# Patient Record
Sex: FEMALE | Race: BLACK OR AFRICAN AMERICAN | URBAN - METROPOLITAN AREA
[De-identification: names, ages, dates, MRNs, and addresses within clinical notes are randomized per-mention and may not be internally consistent; named-entity substitution may affect disease eponyms.]

---

## 2021-09-21 ENCOUNTER — OFFICE VISIT (OUTPATIENT)
Dept: URBAN - METROPOLITAN AREA CLINIC 105 | Facility: CLINIC | Age: 41
End: 2021-09-21
Payer: COMMERCIAL

## 2021-09-21 ENCOUNTER — WEB ENCOUNTER (OUTPATIENT)
Dept: URBAN - METROPOLITAN AREA CLINIC 105 | Facility: CLINIC | Age: 41
End: 2021-09-21

## 2021-09-21 VITALS
TEMPERATURE: 97.5 F | HEIGHT: 65 IN | SYSTOLIC BLOOD PRESSURE: 134 MMHG | WEIGHT: 165.8 LBS | HEART RATE: 66 BPM | BODY MASS INDEX: 27.63 KG/M2 | DIASTOLIC BLOOD PRESSURE: 85 MMHG

## 2021-09-21 DIAGNOSIS — K62.89 RECTAL PAIN: ICD-10-CM

## 2021-09-21 DIAGNOSIS — K59.09 CHRONIC CONSTIPATION: ICD-10-CM

## 2021-09-21 DIAGNOSIS — R10.2 PELVIC PAIN: ICD-10-CM

## 2021-09-21 PROCEDURE — 99204 OFFICE O/P NEW MOD 45 MIN: CPT | Performed by: INTERNAL MEDICINE

## 2021-09-21 RX ORDER — GABAPENTIN 300 MG/1
CAPSULE ORAL
Qty: 30 | Status: ACTIVE | COMMUNITY

## 2021-09-21 RX ORDER — LINACLOTIDE 145 UG/1
1 CAPSULE AT LEAST 30 MINUTES BEFORE THE FIRST MEAL OF THE DAY ON AN EMPTY STOMACH CAPSULE, GELATIN COATED ORAL ONCE A DAY
Qty: 90 | Refills: 1

## 2021-09-21 RX ORDER — LINACLOTIDE 145 UG/1
1 CAPSULE AT LEAST 30 MINUTES BEFORE THE FIRST MEAL OF THE DAY ON AN EMPTY STOMACH CAPSULE, GELATIN COATED ORAL ONCE A DAY
Status: ACTIVE | COMMUNITY

## 2021-09-21 RX ORDER — IBUPROFEN 800 MG/1
1 TABLET WITH FOOD OR MILK AS NEEDED TABLET ORAL THREE TIMES A DAY
Status: ACTIVE | COMMUNITY

## 2021-09-21 NOTE — HPI-TODAY'S VISIT:
40 yo female who presents with rectal pain since Jan. 2021.  Patient lives in [place], [place].  She states she has the pain constantly for a week every month.  It can wax and wane during that week.  There is no pattern to the pain.  She has used Proctofoam for 2 months which helped in the beginning.  She tried a warm water bath which helped in the beginning.  However, both of these soon did not provide relief.  She tried ibuprofen 800 mg 2-3x/day with no relief.  She tried hydrocodone 5 mg which made her sleepy, but did not help.  She saw her PCP and then her OB-GYN.  She had a hysterectomy in July 2021 (had a previous ablation for uterine bleeding in 2018) thinking this would resolve her pain, but it did not.  She was referred to a GI in [place] who did a colonoscopy 8 days ago.  She was noted to have an area of abnormal mucosa/raised area in the rectum which on biopsies was hyperplastic mucosa.  She states her OB-GYN referred her to a GI in [place], but she presented to me today instead.    She has a history of chronic constipation and takes Linzess 72 mcg QD on an empty stomach.  She has taken it for a month.  She has a BM every 4-5 days on it.

## 2021-11-03 ENCOUNTER — OFFICE VISIT (OUTPATIENT)
Dept: URBAN - METROPOLITAN AREA CLINIC 105 | Facility: CLINIC | Age: 41
End: 2021-11-03
Payer: COMMERCIAL

## 2021-11-03 ENCOUNTER — LAB OUTSIDE AN ENCOUNTER (OUTPATIENT)
Dept: URBAN - METROPOLITAN AREA CLINIC 105 | Facility: CLINIC | Age: 41
End: 2021-11-03

## 2021-11-03 VITALS
WEIGHT: 161.8 LBS | SYSTOLIC BLOOD PRESSURE: 140 MMHG | BODY MASS INDEX: 26.96 KG/M2 | HEART RATE: 73 BPM | HEIGHT: 65 IN | DIASTOLIC BLOOD PRESSURE: 83 MMHG | TEMPERATURE: 97.2 F

## 2021-11-03 DIAGNOSIS — R10.2 PELVIC PAIN: ICD-10-CM

## 2021-11-03 DIAGNOSIS — K59.09 CHRONIC CONSTIPATION: ICD-10-CM

## 2021-11-03 DIAGNOSIS — K62.89 RECTAL PAIN: ICD-10-CM

## 2021-11-03 PROCEDURE — 99214 OFFICE O/P EST MOD 30 MIN: CPT | Performed by: INTERNAL MEDICINE

## 2021-11-03 RX ORDER — LINACLOTIDE 145 UG/1
1 CAPSULE AT LEAST 30 MINUTES BEFORE THE FIRST MEAL OF THE DAY ON AN EMPTY STOMACH CAPSULE, GELATIN COATED ORAL ONCE A DAY
Qty: 90 | Refills: 1 | Status: ACTIVE | COMMUNITY

## 2021-11-03 RX ORDER — GABAPENTIN 300 MG/1
CAPSULE ORAL
Qty: 30 | Status: ON HOLD | COMMUNITY

## 2021-11-03 RX ORDER — IBUPROFEN 800 MG/1
1 TABLET WITH FOOD OR MILK AS NEEDED TABLET ORAL THREE TIMES A DAY
Status: ACTIVE | COMMUNITY

## 2021-11-03 NOTE — HPI-TODAY'S VISIT:
42 yo female who presents with rectal pain since Jan. 2021.  Patient lives in [place], [place].  On 9/21/21, she stated she had the pain constantly for a week every month.  It could wax and wane during that week. There was no pattern to the pain. She had used Proctofoam for 2 months which helped in the beginning. She tried a warm water bath which helped in the beginning.  However, both of these soon did not provide relief.  She tried ibuprofen 800 mg 2-3x/day with no relief. She tried hydrocodone 5 mg which made her sleepy, but did not help. She saw her PCP and then her OB-GYN.  She had a hysterectomy in July 2021 (had a previous ablation for uterine bleeding in 2018) thinking this would resolve her pain, but it did not. She was referred to a GI in [place] who did a colonoscopy 8 days ago. She was noted to have an area of abnormal mucosa/raised area in the rectum which on biopsies was hyperplastic mucosa. She stated her OB-GYN referred her to a GI in [place], but she presented to me today instead.    She had a history of chronic constipation and took Linzess 72 mcg QD on an empty stomach. She had taken it for a month.  She had a BM every 4-5 days on it.  Today, she says she hasn't needed to use nitroglycerin. She says she was doing well for the first 2 weeks on Linzess 145 mcg QD - had 1 soft BM QD - but has increased to 2 pills QAM (empty stomach) for the last few days. She is not using Miralax. Constipation has been an issue all her life.  She got COVID Oct 1st (headache, fever, fatigue, SOB). She is not COVID or flu vaccinated. Pelvic pain has resolved.

## 2021-11-05 ENCOUNTER — TELEPHONE ENCOUNTER (OUTPATIENT)
Dept: URBAN - METROPOLITAN AREA CLINIC 105 | Facility: CLINIC | Age: 41
End: 2021-11-05

## 2021-12-15 ENCOUNTER — OFFICE VISIT (OUTPATIENT)
Dept: URBAN - METROPOLITAN AREA CLINIC 105 | Facility: CLINIC | Age: 41
End: 2021-12-15

## 2021-12-22 ENCOUNTER — OFFICE VISIT (OUTPATIENT)
Dept: URBAN - METROPOLITAN AREA CLINIC 105 | Facility: CLINIC | Age: 41
End: 2021-12-22
Payer: COMMERCIAL

## 2021-12-22 VITALS
HEIGHT: 65 IN | BODY MASS INDEX: 26.89 KG/M2 | SYSTOLIC BLOOD PRESSURE: 129 MMHG | WEIGHT: 161.4 LBS | HEART RATE: 64 BPM | TEMPERATURE: 96.9 F | DIASTOLIC BLOOD PRESSURE: 85 MMHG

## 2021-12-22 DIAGNOSIS — R10.2 PELVIC PAIN: ICD-10-CM

## 2021-12-22 DIAGNOSIS — K62.89 RECTAL PAIN: ICD-10-CM

## 2021-12-22 DIAGNOSIS — K59.09 CHRONIC CONSTIPATION: ICD-10-CM

## 2021-12-22 PROCEDURE — 99213 OFFICE O/P EST LOW 20 MIN: CPT | Performed by: INTERNAL MEDICINE

## 2021-12-22 RX ORDER — GABAPENTIN 300 MG/1
CAPSULE ORAL
Qty: 30 | Status: ON HOLD | COMMUNITY

## 2021-12-22 RX ORDER — IBUPROFEN 800 MG/1
1 TABLET WITH FOOD OR MILK AS NEEDED TABLET ORAL THREE TIMES A DAY
Status: ACTIVE | COMMUNITY

## 2021-12-22 RX ORDER — LINACLOTIDE 145 UG/1
1 CAPSULE AT LEAST 30 MINUTES BEFORE THE FIRST MEAL OF THE DAY ON AN EMPTY STOMACH CAPSULE, GELATIN COATED ORAL ONCE A DAY
Qty: 90 | Refills: 1 | Status: ACTIVE | COMMUNITY

## 2021-12-22 RX ORDER — NITROGLYCERIN 0.3 MG/1
AS DIRECTED TABLET SUBLINGUAL
Status: ACTIVE | COMMUNITY

## 2021-12-22 NOTE — HPI-TODAY'S VISIT:
42 yo female who presents with rectal pain since Jan. 2021.  Patient lives in [place], [place].  On 9/21/21, she stated she had the pain constantly for a week every month.  It could wax and wane during that week. There was no pattern to the pain. She had used Proctofoam for 2 months which helped in the beginning. She tried a warm water bath which helped in the beginning.  However, both of these soon did not provide relief.  She tried ibuprofen 800 mg 2-3x/day with no relief. She tried hydrocodone 5 mg which made her sleepy, but did not help. She saw her PCP and then her OB-GYN.  She had a hysterectomy in July 2021 (had a previous ablation for uterine bleeding in 2018) thinking this would resolve her pain, but it did not. She was referred to a GI in [place] who did a colonoscopy 8 days ago. She was noted to have an area of abnormal mucosa/raised area in the rectum which on biopsies was hyperplastic mucosa. She stated her OB-GYN referred her to a GI in [place], but she presented to me today instead.    She had a history of chronic constipation and took Linzess 72 mcg QD on an empty stomach. She had taken it for a month.  She had a BM every 4-5 days on it.  On 11/3/21, she said she hadn't needed to use nitroglycerin. She said she was doing well for the first 2 weeks on Linzess 145 mcg QD - had 1 soft BM QD - but had increased to 2 pills QAM (empty stomach) for the last few days. She was not using Miralax. Constipation had been an issue all her life.  She got COVID Oct 1st (headache, fever, fatigue, SOB). She was not COVID or flu vaccinated. Pelvic pain had resolved.  Today, she says she has been taking Linzess 145 mcg QD. She also added in Miralax 1 cap QD without improvement. She has 4 soft BMs/week with improved evacuation and no straining. She wants more evacuation she states. She has a watery BM 1x/couple weeks. She will have the anorectal manometry done on Jan 20. She took the nitroglycerin supp - no pain at the anus with a BM currently. The last time she had pain was the first of December.

## 2022-02-14 ENCOUNTER — TELEPHONE ENCOUNTER (OUTPATIENT)
Dept: URBAN - METROPOLITAN AREA CLINIC 92 | Facility: CLINIC | Age: 42
End: 2022-02-14

## 2022-02-22 ENCOUNTER — OFFICE VISIT (OUTPATIENT)
Dept: URBAN - METROPOLITAN AREA CLINIC 105 | Facility: CLINIC | Age: 42
End: 2022-02-22
Payer: COMMERCIAL

## 2022-02-22 DIAGNOSIS — K62.89 RECTAL PAIN: ICD-10-CM

## 2022-02-22 DIAGNOSIS — R10.2 PELVIC PAIN: ICD-10-CM

## 2022-02-22 DIAGNOSIS — K59.09 CHRONIC CONSTIPATION: ICD-10-CM

## 2022-02-22 DIAGNOSIS — M62.89 PELVIC FLOOR DYSFUNCTION IN FEMALE: ICD-10-CM

## 2022-02-22 PROCEDURE — 99213 OFFICE O/P EST LOW 20 MIN: CPT | Performed by: INTERNAL MEDICINE

## 2022-02-22 RX ORDER — IBUPROFEN 800 MG/1
1 TABLET WITH FOOD OR MILK AS NEEDED TABLET ORAL THREE TIMES A DAY
Status: ACTIVE | COMMUNITY

## 2022-02-22 RX ORDER — LINACLOTIDE 145 UG/1
1 CAPSULE AT LEAST 30 MINUTES BEFORE THE FIRST MEAL OF THE DAY ON AN EMPTY STOMACH CAPSULE, GELATIN COATED ORAL ONCE A DAY
Qty: 90 | Refills: 1 | Status: ACTIVE | COMMUNITY

## 2022-02-22 RX ORDER — NITROGLYCERIN 0.3 MG/1
AS DIRECTED TABLET SUBLINGUAL
Status: ACTIVE | COMMUNITY

## 2022-02-22 RX ORDER — PSYLLIUM HUSK 0.4 G
AS DIRECTED CAPSULE ORAL
Status: ACTIVE | COMMUNITY

## 2022-02-22 NOTE — HPI-TODAY'S VISIT:
40 yo female who presents with rectal pain since Jan. 2021.  Patient lives in [place], [place].  PAT MEDICAL HISTORY:  On 9/21/21, she stated she had the pain constantly for a week every month.  It could wax and wane during that week. There was no pattern to the pain. She had used Proctofoam for 2 months which helped in the beginning. She tried a warm water bath which helped in the beginning.  However, both of these soon did not provide relief.  She tried ibuprofen 800 mg 2-3x/day with no relief. She tried hydrocodone 5 mg which made her sleepy, but did not help. She saw her PCP and then her OB-GYN.  She had a hysterectomy in July 2021 (had a previous ablation for uterine bleeding in 2018) thinking this would resolve her pain, but it did not. She was referred to a GI in [place] who did a colonoscopy 8 days ago. She was noted to have an area of abnormal mucosa/raised area in the rectum which on biopsies was hyperplastic mucosa. She stated her OB-GYN referred her to a GI in [place], but she presented to me today instead.    She had a history of chronic constipation and took Linzess 72 mcg QD on an empty stomach. She had taken it for a month.  She had a BM every 4-5 days on it.  On 11/3/21, she said she hadn't needed to use nitroglycerin. She said she was doing well for the first 2 weeks on Linzess 145 mcg QD - had 1 soft BM QD - but had increased to 2 pills QAM (empty stomach) for the last few days. She was not using Miralax. Constipation had been an issue all her life.  She got COVID Oct 1st (headache, fever, fatigue, SOB). She was not COVID or flu vaccinated. Pelvic pain had resolved.  On 12/22/21, she said she had been taking Linzess 145 mcg QD. She also added in Miralax 1 cap QD without improvement. She had 4 soft BMs/week with improved evacuation and no straining. She wanted more evacuation she stated. She had a watery BM 1x/couple weeks. She would have the anorectal manometry done on Jan 20. She took the nitroglycerin supp - no pain at the anus with a BM currently. The last time she had pain was the first of December.  HPI:  Today, she says she confused Miralax for Metamucil, so she instead increased her dosage of Metamucil - she changed to pills and is now taking 2 pills QD. She is also taking Linzess 1 pill QD and Colace 1 pill QD. Frequency is unchanged - 4 BMs/week. Evacuation is slightly improved; however, the patient wants to go more frequently. She has not received a return phone call regarding pelvic physical therapy.

## 2022-02-22 NOTE — PHYSICAL EXAM CONSTITUTIONAL:
well developed, well nourished , in no acute distress , ambulating without difficulty , normal communication ability Aperture Size (Optional): A Number Of Freeze-Thaw Cycles: 2 freeze-thaw cycles Post-Care Instructions: I reviewed with the patient in detail post-care instructions. Patient is to wear sunprotection, and avoid picking at any of the treated lesions. Pt may apply Vaseline to crusted or scabbing areas. Duration Of Freeze Thaw-Cycle (Seconds): 2 Detail Level: Detailed Render Post-Care Instructions In Note?: no Consent: The patient's consent was obtained including but not limited to risks of crusting, scabbing, blistering, scarring, darker or lighter pigmentary change, recurrence, incomplete removal and infection.

## 2022-04-01 ENCOUNTER — TELEPHONE ENCOUNTER (OUTPATIENT)
Dept: URBAN - METROPOLITAN AREA CLINIC 92 | Facility: CLINIC | Age: 42
End: 2022-04-01

## 2022-04-01 RX ORDER — LINACLOTIDE 145 UG/1
1 CAPSULE AT LEAST 30 MINUTES BEFORE THE FIRST MEAL OF THE DAY ON AN EMPTY STOMACH CAPSULE, GELATIN COATED ORAL ONCE A DAY
Qty: 90 | Refills: 1

## 2022-05-04 ENCOUNTER — OFFICE VISIT (OUTPATIENT)
Dept: URBAN - METROPOLITAN AREA CLINIC 105 | Facility: CLINIC | Age: 42
End: 2022-05-04
Payer: COMMERCIAL

## 2022-05-04 VITALS
HEART RATE: 73 BPM | WEIGHT: 160 LBS | DIASTOLIC BLOOD PRESSURE: 81 MMHG | TEMPERATURE: 97.3 F | SYSTOLIC BLOOD PRESSURE: 129 MMHG | HEIGHT: 65 IN | BODY MASS INDEX: 26.66 KG/M2

## 2022-05-04 DIAGNOSIS — K62.89 RECTAL PAIN: ICD-10-CM

## 2022-05-04 DIAGNOSIS — K59.09 CHRONIC CONSTIPATION: ICD-10-CM

## 2022-05-04 DIAGNOSIS — R10.2 PELVIC PAIN: ICD-10-CM

## 2022-05-04 DIAGNOSIS — M62.89 PELVIC FLOOR DYSFUNCTION IN FEMALE: ICD-10-CM

## 2022-05-04 PROCEDURE — 99214 OFFICE O/P EST MOD 30 MIN: CPT | Performed by: INTERNAL MEDICINE

## 2022-05-04 RX ORDER — DOCUSATE SODIUM 100 MG/1
1 CAPSULE AS NEEDED CAPSULE ORAL ONCE A DAY
Status: ACTIVE | COMMUNITY

## 2022-05-04 RX ORDER — PSYLLIUM HUSK 0.4 G
AS DIRECTED CAPSULE ORAL
Status: ACTIVE | COMMUNITY

## 2022-05-04 RX ORDER — LINACLOTIDE 145 UG/1
1 CAPSULE AT LEAST 30 MINUTES BEFORE THE FIRST MEAL OF THE DAY ON AN EMPTY STOMACH CAPSULE, GELATIN COATED ORAL ONCE A DAY
Qty: 90 | Refills: 1 | Status: ACTIVE | COMMUNITY

## 2022-05-04 RX ORDER — IBUPROFEN 800 MG/1
1 TABLET WITH FOOD OR MILK AS NEEDED TABLET ORAL THREE TIMES A DAY
Status: ACTIVE | COMMUNITY

## 2022-05-04 RX ORDER — NITROGLYCERIN 0.3 MG/1
AS DIRECTED TABLET SUBLINGUAL
Status: ACTIVE | COMMUNITY

## 2022-05-04 RX ORDER — POLYETHYLENE GLYCOL 3350 17 G/17G
AS DIRECTED POWDER, FOR SOLUTION ORAL
Status: ACTIVE | COMMUNITY

## 2022-05-04 NOTE — HPI-TODAY'S VISIT:
40 yo female who presents with rectal pain since Jan. 2021.  Patient lives in [place], [place].  PAT MEDICAL HISTORY:  On 9/21/21, she stated she had the pain constantly for a week every month.  It could wax and wane during that week. There was no pattern to the pain. She had used Proctofoam for 2 months which helped in the beginning. She tried a warm water bath which helped in the beginning.  However, both of these soon did not provide relief.  She tried ibuprofen 800 mg 2-3x/day with no relief. She tried hydrocodone 5 mg which made her sleepy, but did not help. She saw her PCP and then her OB-GYN.  She had a hysterectomy in July 2021 (had a previous ablation for uterine bleeding in 2018) thinking this would resolve her pain, but it did not. She was referred to a GI in [place] who did a colonoscopy 8 days ago. She was noted to have an area of abnormal mucosa/raised area in the rectum which on biopsies was hyperplastic mucosa. She stated her OB-GYN referred her to a GI in [place], but she presented to me today instead.    She had a history of chronic constipation and took Linzess 72 mcg QD on an empty stomach. She had taken it for a month.  She had a BM every 4-5 days on it.  On 11/3/21, she said she hadn't needed to use nitroglycerin. She said she was doing well for the first 2 weeks on Linzess 145 mcg QD - had 1 soft BM QD - but had increased to 2 pills QAM (empty stomach) for the last few days. She was not using Miralax. Constipation had been an issue all her life.  She got COVID Oct 1st (headache, fever, fatigue, SOB). She was not COVID or flu vaccinated. Pelvic pain had resolved.  On 12/22/21, she said she had been taking Linzess 145 mcg QD. She also added in Miralax 1 cap QD without improvement. She had 4 soft BMs/week with improved evacuation and no straining. She wanted more evacuation she stated. She had a watery BM 1x/couple weeks. She would have the anorectal manometry done on Jan 20. She took the nitroglycerin supp - no pain at the anus with a BM currently. The last time she had pain was the first of December.  On 2/22/22, she said she confused Miralax for Metamucil, so she instead increased her dosage of Metamucil - she changed to pills and was now taking 2 pills QD. She was also taking Linzess 1 pill QD and Colace 1 pill QD. Frequency was unchanged - 4 BMs/week. Evacuation was slightly improved; however, the patient wanted to go more frequently. She had not received a return phone call regarding pelvic physical therapy.  HPI: Today, the patient states she is taking Linzess 145 mcg QD, Colace daily, Metamucil 1 capsule daily, and Miralax 1 cap daily (4-5x/week).  She is having daily BMs up to 3-4x/day that can be watery prompting her to hold Miralax for 2-3 days/week.  She has rectal pain once a month for 3-4 days.  When she has it she uses NTG and Recti-care.  She denies more pain passing stool per anus.

## 2022-05-11 ENCOUNTER — TELEPHONE ENCOUNTER (OUTPATIENT)
Dept: URBAN - METROPOLITAN AREA CLINIC 105 | Facility: CLINIC | Age: 42
End: 2022-05-11

## 2022-08-04 ENCOUNTER — OFFICE VISIT (OUTPATIENT)
Dept: URBAN - METROPOLITAN AREA CLINIC 105 | Facility: CLINIC | Age: 42
End: 2022-08-04
Payer: COMMERCIAL

## 2022-08-04 VITALS
SYSTOLIC BLOOD PRESSURE: 146 MMHG | TEMPERATURE: 97.5 F | DIASTOLIC BLOOD PRESSURE: 85 MMHG | BODY MASS INDEX: 26.66 KG/M2 | HEART RATE: 67 BPM | WEIGHT: 160 LBS | HEIGHT: 65 IN

## 2022-08-04 DIAGNOSIS — M62.89 PELVIC FLOOR DYSFUNCTION IN FEMALE: ICD-10-CM

## 2022-08-04 DIAGNOSIS — K62.89 RECTAL PAIN: ICD-10-CM

## 2022-08-04 DIAGNOSIS — R10.2 PELVIC PAIN: ICD-10-CM

## 2022-08-04 DIAGNOSIS — K59.09 CHRONIC CONSTIPATION: ICD-10-CM

## 2022-08-04 PROCEDURE — 99213 OFFICE O/P EST LOW 20 MIN: CPT | Performed by: INTERNAL MEDICINE

## 2022-08-04 RX ORDER — PSYLLIUM HUSK 0.4 G
AS DIRECTED CAPSULE ORAL
Status: ACTIVE | COMMUNITY

## 2022-08-04 RX ORDER — DOCUSATE SODIUM 100 MG/1
1 CAPSULE AS NEEDED CAPSULE ORAL ONCE A DAY
Status: ACTIVE | COMMUNITY

## 2022-08-04 RX ORDER — POLYETHYLENE GLYCOL 3350 17 G/17G
AS DIRECTED POWDER, FOR SOLUTION ORAL
Status: ACTIVE | COMMUNITY

## 2022-08-04 RX ORDER — IBUPROFEN 800 MG/1
1 TABLET WITH FOOD OR MILK AS NEEDED TABLET ORAL THREE TIMES A DAY
Status: ACTIVE | COMMUNITY

## 2022-08-04 RX ORDER — NITROGLYCERIN 0.3 MG/1
AS DIRECTED TABLET SUBLINGUAL
Status: ACTIVE | COMMUNITY

## 2022-08-04 RX ORDER — LINACLOTIDE 145 UG/1
1 CAPSULE AT LEAST 30 MINUTES BEFORE THE FIRST MEAL OF THE DAY ON AN EMPTY STOMACH CAPSULE, GELATIN COATED ORAL ONCE A DAY
Qty: 90 | Refills: 1 | Status: ACTIVE | COMMUNITY

## 2022-08-04 NOTE — HPI-TODAY'S VISIT:
42 yo female who presents with rectal pain since Jan. 2021.  Patient lives in [place], [place].  PAT MEDICAL HISTORY:  On 9/21/21, she stated she had the pain constantly for a week every month.  It could wax and wane during that week. There was no pattern to the pain. She had used Proctofoam for 2 months which helped in the beginning. She tried a warm water bath which helped in the beginning.  However, both of these soon did not provide relief.  She tried ibuprofen 800 mg 2-3x/day with no relief. She tried hydrocodone 5 mg which made her sleepy, but did not help. She saw her PCP and then her OB-GYN.  She had a hysterectomy in July 2021 (had a previous ablation for uterine bleeding in 2018) thinking this would resolve her pain, but it did not. She was referred to a GI in [place] who did a colonoscopy 8 days ago. She was noted to have an area of abnormal mucosa/raised area in the rectum which on biopsies was hyperplastic mucosa. She stated her OB-GYN referred her to a GI in [place], but she presented to me today instead.    She had a history of chronic constipation and took Linzess 72 mcg QD on an empty stomach. She had taken it for a month.  She had a BM every 4-5 days on it.  On 11/3/21, she said she hadn't needed to use nitroglycerin. She said she was doing well for the first 2 weeks on Linzess 145 mcg QD - had 1 soft BM QD - but had increased to 2 pills QAM (empty stomach) for the last few days. She was not using Miralax. Constipation had been an issue all her life.  She got COVID Oct 1st (headache, fever, fatigue, SOB). She was not COVID or flu vaccinated. Pelvic pain had resolved.  On 12/22/21, she said she had been taking Linzess 145 mcg QD. She also added in Miralax 1 cap QD without improvement. She had 4 soft BMs/week with improved evacuation and no straining. She wanted more evacuation she stated. She had a watery BM 1x/couple weeks. She would have the anorectal manometry done on Jan 20. She took the nitroglycerin supp - no pain at the anus with a BM currently. The last time she had pain was the first of December.  On 2/22/22, she said she confused Miralax for Metamucil, so she instead increased her dosage of Metamucil - she changed to pills and was now taking 2 pills QD. She was also taking Linzess 1 pill QD and Colace 1 pill QD. Frequency was unchanged - 4 BMs/week. Evacuation was slightly improved; however, the patient wanted to go more frequently. She had not received a return phone call regarding pelvic physical therapy.  On 5/4/22, the patient stated she was taking Linzess 145 mcg QD, Colace daily, Metamucil 1 capsule daily, and Miralax 1 cap daily (4-5x/week).  She was having daily BMs up to 3-4x/day that could be watery prompting her to hold Miralax for 2-3 days/week.  She had rectal pain once a month for 3-4 days.  When she had it she used NTG and Recti-care.  She denied more pain passing stool per anus.  HPI: Today, she says she has 1-2 BMs QD without straining and good evac. She is on Miralax 1 cap QD, Linzess, Colace. There is not a pelvic floor physical therapist close enough to her that she would go to for an appt. Rectal pain is less - happens ~1x/month, but can go a month without. There is no time pattern to her pain. Last episode was yesterday. Nitroglycerin resolves the pain quickly. She takes ibuprofen PRN and vit C.

## 2022-11-02 ENCOUNTER — TELEPHONE ENCOUNTER (OUTPATIENT)
Dept: URBAN - METROPOLITAN AREA CLINIC 105 | Facility: CLINIC | Age: 42
End: 2022-11-02

## 2022-11-02 RX ORDER — LINACLOTIDE 145 UG/1
1 CAPSULE AT LEAST 30 MINUTES BEFORE THE FIRST MEAL OF THE DAY ON AN EMPTY STOMACH CAPSULE, GELATIN COATED ORAL ONCE A DAY
Qty: 90 | Refills: 3 | OUTPATIENT

## 2023-03-23 ENCOUNTER — DASHBOARD ENCOUNTERS (OUTPATIENT)
Age: 43
End: 2023-03-23

## 2023-04-04 ENCOUNTER — OFFICE VISIT (OUTPATIENT)
Dept: URBAN - METROPOLITAN AREA CLINIC 105 | Facility: CLINIC | Age: 43
End: 2023-04-04

## 2023-04-04 VITALS — HEIGHT: 65 IN

## 2023-04-04 RX ORDER — LINACLOTIDE 145 UG/1
1 CAPSULE AT LEAST 30 MINUTES BEFORE THE FIRST MEAL OF THE DAY ON AN EMPTY STOMACH CAPSULE, GELATIN COATED ORAL ONCE A DAY
Qty: 90 | Refills: 3 | COMMUNITY

## 2023-04-04 RX ORDER — POLYETHYLENE GLYCOL 3350 17 G/17G
AS DIRECTED POWDER, FOR SOLUTION ORAL
COMMUNITY

## 2023-04-04 RX ORDER — NITROGLYCERIN 0.3 MG/1
AS DIRECTED TABLET SUBLINGUAL
COMMUNITY

## 2023-04-04 RX ORDER — PSYLLIUM HUSK 0.4 G
AS DIRECTED CAPSULE ORAL
COMMUNITY

## 2023-04-04 RX ORDER — DOCUSATE SODIUM 100 MG/1
1 CAPSULE AS NEEDED CAPSULE ORAL ONCE A DAY
COMMUNITY

## 2023-04-04 RX ORDER — IBUPROFEN 800 MG/1
1 TABLET WITH FOOD OR MILK AS NEEDED TABLET ORAL THREE TIMES A DAY
COMMUNITY